# Patient Record
Sex: MALE | Race: WHITE | NOT HISPANIC OR LATINO | Employment: PART TIME | URBAN - METROPOLITAN AREA
[De-identification: names, ages, dates, MRNs, and addresses within clinical notes are randomized per-mention and may not be internally consistent; named-entity substitution may affect disease eponyms.]

---
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
## 2021-11-23 ENCOUNTER — OFFICE VISIT (OUTPATIENT)
Dept: URGENT CARE | Facility: PHYSICIAN GROUP | Age: 21
End: 2021-11-23
Payer: MEDICAID

## 2021-11-23 ENCOUNTER — HOSPITAL ENCOUNTER (OUTPATIENT)
Dept: RADIOLOGY | Facility: MEDICAL CENTER | Age: 21
End: 2021-11-23
Attending: FAMILY MEDICINE
Payer: MEDICAID

## 2021-11-23 VITALS
DIASTOLIC BLOOD PRESSURE: 70 MMHG | BODY MASS INDEX: 24.35 KG/M2 | HEART RATE: 98 BPM | RESPIRATION RATE: 16 BRPM | SYSTOLIC BLOOD PRESSURE: 112 MMHG | TEMPERATURE: 97.7 F | OXYGEN SATURATION: 98 % | HEIGHT: 72 IN | WEIGHT: 179.8 LBS

## 2021-11-23 DIAGNOSIS — R05.9 COUGH: ICD-10-CM

## 2021-11-23 LAB
EXTERNAL QUALITY CONTROL: NORMAL
INT CON NEG: NEGATIVE
INT CON POS: POSITIVE
S PYO AG THROAT QL: NEGATIVE
SARS-COV+SARS-COV-2 AG RESP QL IA.RAPID: NEGATIVE

## 2021-11-23 PROCEDURE — 71046 X-RAY EXAM CHEST 2 VIEWS: CPT

## 2021-11-23 PROCEDURE — 87880 STREP A ASSAY W/OPTIC: CPT | Performed by: FAMILY MEDICINE

## 2021-11-23 PROCEDURE — 99214 OFFICE O/P EST MOD 30 MIN: CPT | Mod: CS | Performed by: FAMILY MEDICINE

## 2021-11-23 PROCEDURE — 87426 SARSCOV CORONAVIRUS AG IA: CPT | Performed by: FAMILY MEDICINE

## 2021-11-23 RX ORDER — BENZONATATE 200 MG/1
200 CAPSULE ORAL 3 TIMES DAILY PRN
Qty: 45 CAPSULE | Refills: 0 | Status: SHIPPED | OUTPATIENT
Start: 2021-11-23 | End: 2021-11-23 | Stop reason: CLARIF

## 2021-11-23 NOTE — PROGRESS NOTES
CC:  cough        Cough  This is a new problem. The current episode started 7 days ago. The problem has been unchanged. The problem occurs constantly. The cough is productive. Associated symptoms include :   Sore throat, Fever, Tmax 101. Pertinent negatives include no   headaches, nausea, vomiting, diarrhea, sweats, weight loss or wheezing. Nothing aggravates the symptoms.  Patient has tried nothing for the symptoms. There is no history of asthma.        No past medical history on file.      Social History     Tobacco Use   • Smoking status: Never Smoker   • Smokeless tobacco: Never Used   Vaping Use   • Vaping Use: Never used   Substance Use Topics   • Alcohol use: Not Currently   • Drug use: Never         Current Outpatient Medications on File Prior to Visit   Medication Sig Dispense Refill   • ibuprofen (MOTRIN) 200 MG Tab Take 200 mg by mouth every 6 hours as needed.       No current facility-administered medications on file prior to visit.                    Review of Systems      HENT: negative for otalgia  Cardiovascular - denies chest pain or dyspnea  Respiratory: Positive for cough.  .  Negative for wheezing.    Neurological: Negative for headaches.   GI - denies nausea, vomiting or diarrhea  Neuro - denies numbness or tingling.            Objective:     /70 (BP Location: Right arm, Patient Position: Sitting, BP Cuff Size: Adult)   Pulse 98   Temp 36.5 °C (97.7 °F) (Temporal)   Resp 16   Ht 1.829 m (6')   Wt 81.6 kg (179 lb 12.8 oz)   SpO2 98%     Physical Exam   Constitutional: patient is oriented to person, place, and time. Patient appears well-developed and well-nourished. No distress.   HENT:   Head: Normocephalic and atraumatic.   Right Ear: External ear normal.   Left Ear: External ear normal.   Nose: Mucosal edema  present. Right sinus exhibits no maxillary sinus tenderness. Left sinus exhibits no maxillary sinus tenderness.   Mouth/Throat: Mucous membranes are normal. No oral lesions.   No posterior pharyngeal erythema.  No oropharyngeal exudate or posterior oropharyngeal edema.   Eyes: Conjunctivae and EOM are normal. Pupils are equal, round, and reactive to light. Right eye exhibits no discharge. Left eye exhibits no discharge. No scleral icterus.   Neck: Normal range of motion. Neck supple. No tracheal deviation present.   Cardiovascular: Normal rate, regular rhythm and normal heart sounds.  Exam reveals no friction rub.    Pulmonary/Chest: Effort normal. No respiratory distress. Patient has no wheezes or rhonchi. Patient has no rales.    Musculoskeletal:  exhibits no edema.   Lymphadenopathy:     Patient has no cervical adenopathy.      Neurological: patient is alert and oriented to person, place, and time.   Skin: Skin is warm and dry. No rash noted. No erythema.   Psychiatric: patient  has a normal mood and affect.  behavior is normal.   Nursing note and vitals reviewed.              Assessment/Plan:       1. Cough  Rapid strep, COVID negative.   Chest x-ray was personally interpreted and reviewed. No acute cardiopulmonary findings. No pulmonary infiltrates or densities. Cardiac silhouette is normal. No hemidiaphragm elevation. No bony abnormalities.    otc cough suppressant, prn      Follow up in one week if no improvement, sooner if symptoms worsen.          My total time spent caring for the patient on the day of the encounter was at least 30 minutes.   This does not include time spent on separately billable procedures/tests.

## 2024-04-23 ENCOUNTER — OFFICE VISIT (OUTPATIENT)
Dept: URGENT CARE | Facility: PHYSICIAN GROUP | Age: 24
End: 2024-04-23
Payer: MEDICAID

## 2024-04-23 ENCOUNTER — HOSPITAL ENCOUNTER (OUTPATIENT)
Dept: RADIOLOGY | Facility: MEDICAL CENTER | Age: 24
End: 2024-04-23
Payer: MEDICAID

## 2024-04-23 VITALS
HEART RATE: 111 BPM | TEMPERATURE: 98.4 F | WEIGHT: 177.25 LBS | BODY MASS INDEX: 24.81 KG/M2 | HEIGHT: 71 IN | RESPIRATION RATE: 18 BRPM | SYSTOLIC BLOOD PRESSURE: 112 MMHG | OXYGEN SATURATION: 98 % | DIASTOLIC BLOOD PRESSURE: 78 MMHG

## 2024-04-23 DIAGNOSIS — J18.9 PNEUMONIA OF RIGHT UPPER LOBE DUE TO INFECTIOUS ORGANISM: ICD-10-CM

## 2024-04-23 DIAGNOSIS — R50.9 FEVER, UNSPECIFIED FEVER CAUSE: ICD-10-CM

## 2024-04-23 DIAGNOSIS — R06.02 SOB (SHORTNESS OF BREATH) ON EXERTION: ICD-10-CM

## 2024-04-23 PROCEDURE — 3078F DIAST BP <80 MM HG: CPT

## 2024-04-23 PROCEDURE — 3074F SYST BP LT 130 MM HG: CPT

## 2024-04-23 PROCEDURE — 94640 AIRWAY INHALATION TREATMENT: CPT

## 2024-04-23 PROCEDURE — 71046 X-RAY EXAM CHEST 2 VIEWS: CPT

## 2024-04-23 PROCEDURE — 99213 OFFICE O/P EST LOW 20 MIN: CPT | Mod: 25

## 2024-04-23 RX ORDER — AMOXICILLIN 500 MG/1
1000 CAPSULE ORAL 3 TIMES DAILY
Qty: 30 CAPSULE | Refills: 0 | Status: SHIPPED | OUTPATIENT
Start: 2024-04-23 | End: 2024-04-28

## 2024-04-23 RX ORDER — IPRATROPIUM BROMIDE AND ALBUTEROL SULFATE 2.5; .5 MG/3ML; MG/3ML
3 SOLUTION RESPIRATORY (INHALATION) ONCE
Status: COMPLETED | OUTPATIENT
Start: 2024-04-23 | End: 2024-04-23

## 2024-04-23 RX ORDER — ALBUTEROL SULFATE 90 UG/1
2 AEROSOL, METERED RESPIRATORY (INHALATION) EVERY 6 HOURS PRN
Qty: 8.5 G | Refills: 0 | Status: SHIPPED | OUTPATIENT
Start: 2024-04-23

## 2024-04-23 RX ORDER — PREDNISONE 10 MG/1
40 TABLET ORAL DAILY
Qty: 20 TABLET | Refills: 0 | Status: SHIPPED | OUTPATIENT
Start: 2024-04-23 | End: 2024-04-28

## 2024-04-23 RX ADMIN — IPRATROPIUM BROMIDE AND ALBUTEROL SULFATE 3 ML: 2.5; .5 SOLUTION RESPIRATORY (INHALATION) at 11:56

## 2024-04-23 ASSESSMENT — ENCOUNTER SYMPTOMS
FEVER: 1
ABDOMINAL PAIN: 0
HEADACHES: 1
NAUSEA: 0
DIARRHEA: 0
VOMITING: 0
CHILLS: 0
COUGH: 0
MYALGIAS: 0
SHORTNESS OF BREATH: 0
SORE THROAT: 0
BACK PAIN: 1

## 2024-04-23 NOTE — PROGRESS NOTES
"Subjective:   Thiago Hernandez is a 23 y.o. male who presents for Fever (On and off fevers, body chills, fatigue, sob x 3.5 days. )      Fever   This is a new problem. Episode onset: 3.5 days ago. The problem has been gradually worsening. The maximum temperature noted was 103 to 103.9 F. The temperature was taken using an axillary reading. Associated symptoms include headaches and muscle aches. Pertinent negatives include no abdominal pain, chest pain, congestion, coughing, diarrhea, ear pain, nausea, rash, sore throat, urinary pain or vomiting. He has tried acetaminophen for the symptoms. The treatment provided mild relief.   Risk factors: no immunosuppression, no recent sickness, no recent travel and no sick contacts        Review of Systems   Constitutional:  Positive for fever and malaise/fatigue. Negative for chills.   HENT:  Negative for congestion, ear pain, hearing loss and sore throat.    Respiratory:  Negative for cough and shortness of breath.    Cardiovascular:  Negative for chest pain.   Gastrointestinal:  Negative for abdominal pain, diarrhea, nausea and vomiting.   Genitourinary:  Negative for dysuria.   Musculoskeletal:  Positive for back pain (Low back aching). Negative for myalgias.   Skin:  Negative for rash.   Neurological:  Positive for headaches.        Lightheadedness when standing       Medications, Allergies, and current problem list reviewed today in Epic.     Objective:     /78 (BP Location: Left arm, Patient Position: Sitting, BP Cuff Size: Adult long)   Pulse (!) 111   Temp 36.9 °C (98.4 °F) (Temporal)   Resp 18   Ht 1.803 m (5' 11\")   Wt 80.4 kg (177 lb 4 oz)   SpO2 98%     Physical Exam  Vitals and nursing note reviewed.   Constitutional:       General: He is not in acute distress.     Appearance: Normal appearance. He is not ill-appearing.   HENT:      Head: Normocephalic and atraumatic.      Right Ear: Tympanic membrane normal.      Left Ear: Tympanic membrane normal.      " Nose: Nose normal.      Mouth/Throat:      Mouth: Mucous membranes are moist.      Pharynx: Oropharynx is clear.   Eyes:      Conjunctiva/sclera: Conjunctivae normal.      Pupils: Pupils are equal, round, and reactive to light.   Cardiovascular:      Rate and Rhythm: Normal rate.      Heart sounds: Normal heart sounds.   Pulmonary:      Effort: Pulmonary effort is normal. No respiratory distress.      Breath sounds: Normal breath sounds. No wheezing.      Comments: Pain with deep inspiration  Abdominal:      General: Abdomen is flat.      Palpations: Abdomen is soft.   Skin:     General: Skin is warm and dry.      Capillary Refill: Capillary refill takes less than 2 seconds.   Neurological:      Mental Status: He is alert and oriented to person, place, and time.   Psychiatric:         Mood and Affect: Mood normal.         Behavior: Behavior normal.       RADIOLOGY RESULTS   DX-CHEST-2 VIEWS    Result Date: 4/23/2024 4/23/2024 11:24 AM HISTORY/REASON FOR EXAM:  Shortness of Breath. Chest pain. TECHNIQUE/EXAM DESCRIPTION AND NUMBER OF VIEWS: Two views of the chest. COMPARISON:  11/23/2021 FINDINGS: The heart is normal in size. Consolidation with poorly defined borders is identified in the anterior segment right upper pulmonary lobe which abuts the minor fissure. No other infiltrates or consolidations are identified. No pleural effusions are appreciated.     1.  Right upper lobe consolidation is identified consistent with pneumonia. Upper lobe location of this infiltrate does raise the possibility of active pulmonary tuberculosis.             Assessment/Plan:       1. Pneumonia of right upper lobe due to infectious organism  DX-CHEST-2 VIEWS    predniSONE (DELTASONE) 10 MG Tab    amoxicillin (AMOXIL) 500 MG Cap    promethazine-dextromethorphan (PROMETHAZINE-DM) 6.25-15 MG/5ML syrup    CANCELED: POCT CoV-2, Flu A/B, RSV by PCR      2. SOB (shortness of breath) on exertion  ipratropium-albuterol (DUONEB) nebulizer  solution    albuterol 108 (90 Base) MCG/ACT Aero Soln inhalation aerosol        ROS and physical exam it does appear the patient has pneumonia of the right upper lobe.  Patient has no symptoms or history that would put him at risk for TB.  Patient was provided DuoNeb treatment in office and did notice mild improvement on work of breathing.  Patient was prescribed amoxicillin, prednisone, and albuterol.  Patient told to take these as prescribed. Recommend adequate hydration, rest, deep breathing and coughing, ambulation as tolerated, OTC medications.  Patient to monitor for any worsening signs and symptoms of any other concerns or if no improvement on antibiotics after 48 to 72 hours to return to the urgent care or emergency department for further management.    Addendum 4/26/2024: Patient contacted urgent care today requesting medication for cough.  Patient was sent prescription for Phenergan cough syrup to take at night.  Patient was informed to take as prescribed with no other sedative medications.    Differential diagnosis, natural history, and supportive care discussed. We also reviewed side effects of medication including allergic response, GI upset, tendon injury, rash, sedation etc. Patient and/or guardian voices understanding.      Advised the patient to follow-up with the primary care physician for recheck, reevaluation, and consideration of further management.    I personally reviewed prior external notes and test results pertinent to today's visit as well as additional imaging and testing completed in clinic today.     Please note that this dictation was created using voice recognition software. I have made every reasonable attempt to correct obvious errors, but I expect that there are errors of grammar and possibly content that I did not discover before finalizing the note.    This note was electronically signed by JAMIE Toribio

## 2024-04-26 RX ORDER — DEXTROMETHORPHAN HYDROBROMIDE AND PROMETHAZINE HYDROCHLORIDE 15; 6.25 MG/5ML; MG/5ML
5 SYRUP ORAL
Qty: 118 ML | Refills: 0 | Status: SHIPPED | OUTPATIENT
Start: 2024-04-26